# Patient Record
Sex: MALE | HISPANIC OR LATINO | Employment: UNEMPLOYED | URBAN - METROPOLITAN AREA
[De-identification: names, ages, dates, MRNs, and addresses within clinical notes are randomized per-mention and may not be internally consistent; named-entity substitution may affect disease eponyms.]

---

## 2020-07-16 ENCOUNTER — OFFICE VISIT (OUTPATIENT)
Dept: FAMILY MEDICINE CLINIC | Facility: CLINIC | Age: 30
End: 2020-07-16

## 2020-07-16 VITALS
TEMPERATURE: 96.8 F | WEIGHT: 219 LBS | SYSTOLIC BLOOD PRESSURE: 124 MMHG | DIASTOLIC BLOOD PRESSURE: 70 MMHG | HEIGHT: 60 IN | HEART RATE: 72 BPM | OXYGEN SATURATION: 99 % | BODY MASS INDEX: 43 KG/M2 | RESPIRATION RATE: 16 BRPM

## 2020-07-16 DIAGNOSIS — M25.531 RIGHT WRIST PAIN: Primary | ICD-10-CM

## 2020-07-16 DIAGNOSIS — L72.9 SKIN CYST: ICD-10-CM

## 2020-07-16 PROBLEM — Z86.69 HX OF MIGRAINES: Status: ACTIVE | Noted: 2019-01-26

## 2020-07-16 PROBLEM — R29.90 STROKE-LIKE SYMPTOMS: Status: ACTIVE | Noted: 2019-01-26

## 2020-07-16 PROCEDURE — 99202 OFFICE O/P NEW SF 15 MIN: CPT | Performed by: NURSE PRACTITIONER

## 2020-07-16 RX ORDER — MELOXICAM 15 MG/1
15 TABLET ORAL DAILY
Qty: 30 TABLET | Refills: 3 | Status: SHIPPED | OUTPATIENT
Start: 2020-07-16 | End: 2020-12-07

## 2020-07-16 NOTE — PROGRESS NOTES
"Subjective   Bautista Weber is a 30 y.o. female. Pt is new to me and practice. Kvng Apple assisted with Finnish translation during visit.   Hand Pain    History of Present Illness   Pt is here for c/o of right hand pain. Pt reports that he's had the pain for several years, but over the last month, pain has been worse. He has not taken anything for his pain. Pt denies any injury. Pt paints houses for a living.    Pt is also c/o pain in his left upper back, he report that he has had this pain for a couple of years. He states that he has not been seen about it and \"just lives with it\". Pt is not taking anything for his pain.     The following portions of the patient's history were reviewed and updated as appropriate: allergies, current medications, past family history, past medical history, past social history, past surgical history and problem list.    Review of Systems   Constitutional: Negative for chills, fatigue and fever.   HENT: Positive for congestion.    Respiratory: Negative.  Negative for cough, chest tightness and shortness of breath.    Cardiovascular: Negative.  Negative for chest pain, palpitations and leg swelling.   Musculoskeletal: Positive for back pain (left upper back.). Negative for arthralgias (right wrist. ), gait problem, joint swelling, myalgias, neck pain and neck stiffness.   Skin: Negative for dry skin.   Allergic/Immunologic: Positive for environmental allergies (seasonal allergies).   Neurological: Positive for weakness. Negative for dizziness and headache.   Psychiatric/Behavioral: Negative.  Negative for sleep disturbance and depressed mood. The patient is not nervous/anxious.        Objective   Physical Exam   Constitutional: She is oriented to person, place, and time. She appears well-developed and well-nourished.   HENT:   Head: Normocephalic and atraumatic.   Eyes: Pupils are equal, round, and reactive to light. Conjunctivae and EOM are normal.   Neck: Normal range of " motion and full passive range of motion without pain. Neck supple. No thyromegaly present.   Cardiovascular: Normal rate, regular rhythm, normal heart sounds and intact distal pulses.   No murmur heard.  Pulmonary/Chest: Effort normal and breath sounds normal.   Musculoskeletal: Normal range of motion. She exhibits no edema or deformity.        Right wrist: She exhibits tenderness. She exhibits no swelling, no effusion, no crepitus, no deformity and no laceration.        Cervical back: Normal.        Thoracic back: Normal.        Lumbar back: Normal.   Lymphadenopathy:     She has no cervical adenopathy.   Neurological: She is alert and oriented to person, place, and time.   Skin: Skin is warm and dry. No rash noted.   Grape sized lump under skin on upper left shoulder,  pain with palpation.    Psychiatric: She has a normal mood and affect. Her behavior is normal. Judgment and thought content normal.   Nursing note and vitals reviewed.        Assessment/Plan   Problem List Items Addressed This Visit     None      Visit Diagnoses     Right wrist pain    -  Primary    Relevant Medications    meloxicam (Mobic) 15 MG tablet    Other Relevant Orders    Ambulatory Referral to Hand Surgery    Skin cyst        Relevant Orders    Ambulatory Referral to Dermatology      Return in about 1 month (around 8/16/2020) for Annual, Labs.         Return in about 1 month (around 8/16/2020) for Annual, Labs.

## 2020-12-07 ENCOUNTER — OFFICE VISIT (OUTPATIENT)
Dept: FAMILY MEDICINE CLINIC | Facility: CLINIC | Age: 30
End: 2020-12-07

## 2020-12-07 VITALS
SYSTOLIC BLOOD PRESSURE: 112 MMHG | WEIGHT: 230 LBS | OXYGEN SATURATION: 98 % | HEIGHT: 69 IN | HEART RATE: 64 BPM | DIASTOLIC BLOOD PRESSURE: 74 MMHG | BODY MASS INDEX: 34.07 KG/M2 | RESPIRATION RATE: 14 BRPM

## 2020-12-07 DIAGNOSIS — Z23 NEED FOR IMMUNIZATION AGAINST INFLUENZA: ICD-10-CM

## 2020-12-07 DIAGNOSIS — R42 VERTIGO: ICD-10-CM

## 2020-12-07 DIAGNOSIS — Z13.220 SCREENING FOR HYPERLIPIDEMIA: ICD-10-CM

## 2020-12-07 DIAGNOSIS — Z79.899 HIGH RISK MEDICATION USE: ICD-10-CM

## 2020-12-07 DIAGNOSIS — Z11.59 NEED FOR HEPATITIS C SCREENING TEST: ICD-10-CM

## 2020-12-07 DIAGNOSIS — Z00.00 ANNUAL PHYSICAL EXAM: Primary | ICD-10-CM

## 2020-12-07 LAB
ALBUMIN SERPL-MCNC: 4.7 G/DL (ref 3.5–5.2)
ALBUMIN/GLOB SERPL: 1.9 G/DL
ALP SERPL-CCNC: 87 U/L (ref 39–117)
ALT SERPL-CCNC: 55 U/L (ref 1–41)
AST SERPL-CCNC: 26 U/L (ref 1–40)
BASOPHILS # BLD AUTO: 0.06 10*3/MM3 (ref 0–0.2)
BASOPHILS NFR BLD AUTO: 0.9 % (ref 0–1.5)
BILIRUB SERPL-MCNC: 0.4 MG/DL (ref 0–1.2)
BUN SERPL-MCNC: 11 MG/DL (ref 6–20)
BUN/CREAT SERPL: 12.5 (ref 7–25)
CALCIUM SERPL-MCNC: 9.3 MG/DL (ref 8.6–10.5)
CHLORIDE SERPL-SCNC: 103 MMOL/L (ref 98–107)
CHOLEST SERPL-MCNC: 178 MG/DL (ref 0–200)
CHOLEST/HDLC SERPL: 5.56 {RATIO}
CO2 SERPL-SCNC: 26.1 MMOL/L (ref 22–29)
CREAT SERPL-MCNC: 0.88 MG/DL (ref 0.76–1.27)
EOSINOPHIL # BLD AUTO: 0.08 10*3/MM3 (ref 0–0.4)
EOSINOPHIL NFR BLD AUTO: 1.3 % (ref 0.3–6.2)
ERYTHROCYTE [DISTWIDTH] IN BLOOD BY AUTOMATED COUNT: 13.4 % (ref 12.3–15.4)
GLOBULIN SER CALC-MCNC: 2.5 GM/DL
GLUCOSE SERPL-MCNC: 102 MG/DL (ref 65–99)
HCT VFR BLD AUTO: 47 % (ref 37.5–51)
HDLC SERPL-MCNC: 32 MG/DL (ref 40–60)
HGB BLD-MCNC: 16.1 G/DL (ref 13–17.7)
IMM GRANULOCYTES # BLD AUTO: 0.03 10*3/MM3 (ref 0–0.05)
IMM GRANULOCYTES NFR BLD AUTO: 0.5 % (ref 0–0.5)
LDLC SERPL CALC-MCNC: 92 MG/DL (ref 0–100)
LYMPHOCYTES # BLD AUTO: 1.51 10*3/MM3 (ref 0.7–3.1)
LYMPHOCYTES NFR BLD AUTO: 23.8 % (ref 19.6–45.3)
MCH RBC QN AUTO: 28.6 PG (ref 26.6–33)
MCHC RBC AUTO-ENTMCNC: 34.3 G/DL (ref 31.5–35.7)
MCV RBC AUTO: 83.6 FL (ref 79–97)
MONOCYTES # BLD AUTO: 0.49 10*3/MM3 (ref 0.1–0.9)
MONOCYTES NFR BLD AUTO: 7.7 % (ref 5–12)
NEUTROPHILS # BLD AUTO: 4.18 10*3/MM3 (ref 1.7–7)
NEUTROPHILS NFR BLD AUTO: 65.8 % (ref 42.7–76)
NRBC BLD AUTO-RTO: 0 /100 WBC (ref 0–0.2)
PLATELET # BLD AUTO: 241 10*3/MM3 (ref 140–450)
POTASSIUM SERPL-SCNC: 4.1 MMOL/L (ref 3.5–5.2)
PROT SERPL-MCNC: 7.2 G/DL (ref 6–8.5)
RBC # BLD AUTO: 5.62 10*6/MM3 (ref 4.14–5.8)
SODIUM SERPL-SCNC: 139 MMOL/L (ref 136–145)
TRIGL SERPL-MCNC: 321 MG/DL (ref 0–150)
VLDLC SERPL CALC-MCNC: 54 MG/DL (ref 5–40)
WBC # BLD AUTO: 6.35 10*3/MM3 (ref 3.4–10.8)

## 2020-12-07 PROCEDURE — 99395 PREV VISIT EST AGE 18-39: CPT | Performed by: NURSE PRACTITIONER

## 2020-12-07 PROCEDURE — 90686 IIV4 VACC NO PRSV 0.5 ML IM: CPT | Performed by: NURSE PRACTITIONER

## 2020-12-07 PROCEDURE — 99213 OFFICE O/P EST LOW 20 MIN: CPT | Performed by: NURSE PRACTITIONER

## 2020-12-07 PROCEDURE — 90471 IMMUNIZATION ADMIN: CPT | Performed by: NURSE PRACTITIONER

## 2020-12-07 RX ORDER — MECLIZINE HYDROCHLORIDE 25 MG/1
25 TABLET ORAL 3 TIMES DAILY PRN
Qty: 30 TABLET | Refills: 1 | Status: SHIPPED | OUTPATIENT
Start: 2020-12-07 | End: 2020-12-17

## 2020-12-07 NOTE — PATIENT INSTRUCTIONS
I will call you with your lab results.   Please call with any questions or concerns.   Return in about 1 year (around 2021) for Annual, Labs.  Mareos  Dizziness  Los mareos son un problema muy frecuente. Causan sensación de inestabilidad o de desvanecimiento. Puede sentir que se va a desmayar. Los mareos pueden provocarle noris lesión si se tropieza o se . La causa puede deberse a muchos problemas, tales mio los siguientes:  · Medicamentos.  · No tener suficiente agua en el cuerpo (deshidratación).  · Enfermedad.  Siga estas indicaciones en bobby casa:  Comida y bebida    · Brianne suficiente líquido para mantener el pis (orina) key o de color amarillo pálido. [place] tena la deshidratación. Trate de beber más líquidos transparentes, mio agua.  · No brianne alcohol.  · Limite la cantidad de cafeína que prashant o come si el médico se lo indica.  · Limite la cantidad de sal (sodio) que prashant o come si el médico se lo indica.  Actividad    · Evite los movimientos rápidos.  ? Cuando se levante de noris silla, sujétese hasta sentirse rae.  ? Por la mañana, siéntese shan a un lado de la cama. Cuando se sienta rae, póngase lentamente de pie mientras se sostiene de algo. Jairo esto hasta que se sienta seguro en cuanto al equilibrio.  · Mueva las piernas con frecuencia si debe estar de pie en un lugar shoaib mucho tiempo. Mientras esté de pie, contraiga y relaje los músculos de las piernas.  · No conduzca vehículos ni opere maquinaria pesada si se siente mareado.  · Evite agacharse si se siente mareado. En bobby casa, coloque los objetos en algún lugar que le resulte fácil alcanzarlos sin agacharse.  Estilo de lonny  · No consuma ningún producto que contenga nicotina o tabaco, mio cigarrillos y cigarrillos electrónicos. Si necesita ayuda para dejar de fumar, consulte al médico.  · Intente bajar el nivel de estrés. Para hacerlo, puede usar métodos mio el yoga o la meditación. Hable con el médico si necesita ayuda.  Instrucciones  generales  · Controle goyo mareos para saulo si hay cambios.  · [place] los medicamentos de venta katina y los recetados solamente mio se lo haya indicado el médico. Hable con el médico si melody que la causa de goyo mareos es algún medicamento que está tomando.  · Infórmele a un amigo o a un familiar si se siente mareado. Pídale a esta persona que llame al médico si observa cambios en bobby comportamiento.  · Concurra a todas las visitas de control mio se lo haya indicado el médico. [place] es importante.  Comuníquese con un médico si:  · Los mareos persisten.  · Los mareos o la sensación de desvanecimiento empeoran.  · Siente malestar estomacal (náuseas).  · Tiene problemas para escuchar.  · Aparecen nuevos síntomas.  · Siente inestabilidad al estar de pie.  · Siente que la habitación da vueltas.  Solicite ayuda de inmediato si:  · Vomita o tiene heces acuosas (diarrea), y no puede comer o beber nada.  · Tiene dificultad para hacer lo siguiente:  ? Hablar.  ? Caminar.  ? Tragar.  ? Usar los brazos, las alexandria o las piernas.  · Se siente constantemente débil.  · No piensa con claridad o tiene dificultad para armar oraciones. Es posible que un amigo o un familiar adviertan que esto ocurre.  · Tiene los siguientes síntomas:  ? Dolor en el pecho.  ? Dolor en el vientre (abdomen).  ? Falta de aire.  ? Sudoración.  · Cambios en la visión.  · Sangrado.  · Dolor de gui muy intenso.  · Dolor o rigidez en el taran.  · Fiebre.  Estos síntomas pueden indicar noris emergencia. No espere hasta que los síntomas desaparezcan. Solicite atención médica de inmediato. Comuníquese con el servicio de emergencias de bobby localidad (911 en [place]). No conduzca por goyo propios medios hasta el hospital.  Resumen  · Los mareos causan sensación de inestabilidad o de desvanecimiento. Puede sentir que se va a desmayar.  · [place] suficiente líquido para mantener el pis (orina) key o de color amarillo pálido. No james alcohol.  · Evite los  movimientos rápidos si se siente mareado.  · Controle goyo mareos para saulo si hay cambios.  Esta información no tiene mio fin reemplazar el consejo del médico. Asegúrese de hacerle al médico cualquier pregunta que tenga.  Document Revised: 06/21/2018 Document Reviewed: 06/21/2018  Elsevier Patient Education © 2020 [employer] Inc.

## 2020-12-07 NOTE — PROGRESS NOTES
Preventive Exam    History of Present Illness: Bautista Weber is a 30 y.o. here for check up and review of routine health maintenance. he states he is doing well and has no concerns.  A  was present during visit and assisted with history and visit.     Past medical history, surgical history and family history have been reviewed.     Review of Systems   Constitutional: Negative for appetite change, chills, fatigue, fever, unexpected weight gain and unexpected weight loss.   HENT: Positive for congestion (in PM). Negative for dental problem, postnasal drip, rhinorrhea, sinus pressure and sore throat.         Dental exam is up to date.    Eyes: Negative.  Negative for blurred vision, double vision and photophobia.        Patient wears glasses. Eye exam is up to date.    Respiratory: Negative for cough, chest tightness, shortness of breath and wheezing.    Cardiovascular: Negative for chest pain, palpitations and leg swelling.   Gastrointestinal: Positive for diarrhea (with spicy foods. ). Negative for abdominal pain, constipation, nausea, vomiting and GERD.   Endocrine: Positive for polyphagia and polyuria. Negative for cold intolerance, heat intolerance and polydipsia.   Genitourinary: Negative.  Negative for decreased libido, dysuria, frequency, nocturia, scrotal swelling and testicular pain.   Musculoskeletal: Negative for arthralgias, back pain, joint swelling and myalgias.   Skin: Negative.    Allergic/Immunologic: Negative.  Negative for environmental allergies and food allergies.   Neurological: Positive for dizziness. Negative for weakness, numbness and headache.   Hematological: Negative.  Does not bruise/bleed easily.   Psychiatric/Behavioral: Negative.  Negative for sleep disturbance, suicidal ideas and depressed mood. The patient is not nervous/anxious.        PHYSICAL EXAM    Vitals:    12/07/20 0825   BP: 112/74   Pulse: 64   Resp: 14   SpO2: 98%     Body mass index is 33.97  kg/m².    Physical Exam  Vitals signs and nursing note reviewed.   Constitutional:       Appearance: He is well-developed.   HENT:      Head: Normocephalic and atraumatic.      Right Ear: Tympanic membrane, ear canal and external ear normal.      Left Ear: Tympanic membrane, ear canal and external ear normal.      Nose: Nose normal.      Mouth/Throat:      Lips: Pink.      Mouth: Mucous membranes are moist.      Tongue: No lesions.      Palate: No mass and lesions.      Pharynx: Oropharynx is clear. Uvula midline.      Tonsils: No tonsillar exudate.   Eyes:      Conjunctiva/sclera: Conjunctivae normal.      Pupils: Pupils are equal, round, and reactive to light.   Neck:      Musculoskeletal: Normal range of motion and neck supple.      Thyroid: No thyromegaly.   Cardiovascular:      Rate and Rhythm: Normal rate and regular rhythm.      Pulses: Normal pulses.           Dorsalis pedis pulses are 2+ on the right side and 2+ on the left side.        Posterior tibial pulses are 2+ on the right side and 2+ on the left side.      Heart sounds: Normal heart sounds. No murmur.   Pulmonary:      Effort: Pulmonary effort is normal.      Breath sounds: Normal breath sounds.   Abdominal:      General: Bowel sounds are normal. There is no distension.      Palpations: Abdomen is soft.      Tenderness: There is no abdominal tenderness.   Musculoskeletal: Normal range of motion.         General: No deformity.      Right lower leg: No edema.      Left lower leg: No edema.   Lymphadenopathy:      Head:      Right side of head: No submental, submandibular, tonsillar, preauricular, posterior auricular or occipital adenopathy.      Left side of head: No submental, submandibular, tonsillar, preauricular, posterior auricular or occipital adenopathy.      Cervical: No cervical adenopathy.      Right cervical: No superficial, deep or posterior cervical adenopathy.     Left cervical: No superficial, deep or posterior cervical adenopathy.       Upper Body:      Right upper body: No supraclavicular adenopathy.      Left upper body: No supraclavicular adenopathy.   Skin:     General: Skin is warm and dry.      Capillary Refill: Capillary refill takes 2 to 3 seconds.   Neurological:      General: No focal deficit present.      Mental Status: He is alert and oriented to person, place, and time.      Cranial Nerves: Cranial nerves are intact. No cranial nerve deficit.      Sensory: Sensation is intact.      Motor: Motor function is intact.      Coordination: Coordination is intact.      Gait: Gait is intact.   Psychiatric:         Attention and Perception: Attention and perception normal.         Mood and Affect: Mood normal.         Speech: Speech normal.         Behavior: Behavior normal. Behavior is cooperative.         Thought Content: Thought content normal.         Cognition and Memory: Cognition and memory normal.         Judgment: Judgment normal.         Procedures    Diagnoses and all orders for this visit:    1. Annual physical exam (Primary)  -     Comprehensive Metabolic Panel  -     Lipid Panel With / Chol / HDL Ratio  -     CBC & Differential  -     Hepatitis C Antibody    2. Need for immunization against influenza  -     FluLaval Quad >6 Months (3179-6262)    3. Screening for hyperlipidemia  -     Lipid Panel With / Chol / HDL Ratio    4. Need for hepatitis C screening test  -     Hepatitis C Antibody    5. High risk medication use  -     Comprehensive Metabolic Panel  -     CBC & Differential    6. Vertigo  -     Ambulatory Referral to ENT (Otolaryngology)  -     meclizine (ANTIVERT) 25 MG tablet; Take 1 tablet by mouth 3 (Three) Times a Day As Needed for Dizziness for up to 10 days.  Dispense: 30 tablet; Refill: 1        Problems Addressed this Visit     None      Visit Diagnoses     Annual physical exam    -  Primary    Relevant Orders    Comprehensive Metabolic Panel    Lipid Panel With / Chol / HDL Ratio    CBC & Differential    Hepatitis C  Antibody    Need for immunization against influenza        Relevant Orders    FluLaval Quad >6 Months (6786-5511)    Screening for hyperlipidemia        Relevant Orders    Lipid Panel With / Chol / HDL Ratio    Need for hepatitis C screening test        Relevant Orders    Hepatitis C Antibody    High risk medication use        Relevant Orders    Comprehensive Metabolic Panel    CBC & Differential    Vertigo        Relevant Medications    meclizine (ANTIVERT) 25 MG tablet    Other Relevant Orders    Ambulatory Referral to ENT (Otolaryngology)      Diagnoses       Codes Comments    Annual physical exam    -  Primary ICD-10-CM: Z00.00  ICD-9-CM: V70.0     Need for immunization against influenza     ICD-10-CM: Z23  ICD-9-CM: V04.81     Screening for hyperlipidemia     ICD-10-CM: Z13.220  ICD-9-CM: V77.91     Need for hepatitis C screening test     ICD-10-CM: Z11.59  ICD-9-CM: V73.89     High risk medication use     ICD-10-CM: Z79.899  ICD-9-CM: V58.69     Vertigo     ICD-10-CM: R42  ICD-9-CM: 780.4           Routine health maintenance reviewed and discussed with Bautista Weber.    Preventative counseling regarding healthy diet and exercise.   Pt reports that he wears a seatbelt regularly.  Return in about 1 year (around 2021) for Annual, Labs.     Patient Instructions   I will call you with your lab results.   Please call with any questions or concerns.   Return in about 1 year (around 2021) for Annual, Labs.  Mareos  Dizziness  Los mareos son un problema muy frecuente. Causan sensación de inestabilidad o de desvanecimiento. Puede sentir que se va a desmayar. Los mareos pueden provocarle noris lesión si se tropieza o se . La causa puede deberse a muchos problemas, tales mio los siguientes:  · Medicamentos.  · No tener suficiente agua en el cuerpo (deshidratación).  · Enfermedad.  Siga estas indicaciones en bobby casa:  Comida y bebida    · Brianne suficiente líquido para mantener el pis (orina) key o de  color amarillo pálido. [place] tena la deshidratación. Trate de beber más líquidos transparentes, mio agua.  · No james alcohol.  · Limite la cantidad de cafeína que prashant o come si el médico se lo indica.  · Limite la cantidad de sal (sodio) que prashant o come si el médico se lo indica.  Actividad    · Evite los movimientos rápidos.  ? Cuando se levante de noris silla, sujétese hasta sentirse rae.  ? Por la mañana, siéntese shan a un lado de la cama. Cuando se sienta rae, póngase lentamente de pie mientras se sostiene de algo. Jairo esto hasta que se sienta seguro en cuanto al equilibrio.  · Mueva las piernas con frecuencia si debe estar de pie en un lugar shoaib mucho tiempo. Mientras esté de pie, contraiga y relaje los músculos de las piernas.  · No conduzca vehículos ni opere maquinaria pesada si se siente mareado.  · Evite agacharse si se siente mareado. En bobby casa, coloque los objetos en algún lugar que le resulte fácil alcanzarlos sin agacharse.  Estilo de lonny  · No consuma ningún producto que contenga nicotina o tabaco, mio cigarrillos y cigarrillos electrónicos. Si necesita ayuda para dejar de fumar, consulte al médico.  · Intente bajar el nivel de estrés. Para hacerlo, puede usar métodos mio el yoga o la meditación. Hable con el médico si necesita ayuda.  Instrucciones generales  · Controle goyo mareos para saulo si hay cambios.  · [place] los medicamentos de venta katina y los recetados solamente mio se lo haya indicado el médico. Hable con el médico si melody que la causa de goyo mareos es algún medicamento que está tomando.  · Infórmele a un amigo o a un familiar si se siente mareado. Pídale a esta persona que llame al médico si observa cambios en bobby comportamiento.  · Concurra a todas las visitas de control mio se lo haya indicado el médico. [place] es importante.  Comuníquese con un médico si:  · Los mareos persisten.  · Los mareos o la sensación de desvanecimiento empeoran.  · Siente malestar estomacal  (náuseas).  · Tiene problemas para escuchar.  · Aparecen nuevos síntomas.  · Siente inestabilidad al estar de pie.  · Siente que la habitación da vueltas.  Solicite ayuda de inmediato si:  · Vomita o tiene heces acuosas (diarrea), y no puede comer o beber nada.  · Tiene dificultad para hacer lo siguiente:  ? Hablar.  ? Caminar.  ? Tragar.  ? Usar los brazos, las alexandria o las piernas.  · Se siente constantemente débil.  · No piensa con claridad o tiene dificultad para armar oraciones. Es posible que un amigo o un familiar adviertan que esto ocurre.  · Tiene los siguientes síntomas:  ? Dolor en el pecho.  ? Dolor en el vientre (abdomen).  ? Falta de aire.  ? Sudoración.  · Cambios en la visión.  · Sangrado.  · Dolor de gui muy intenso.  · Dolor o rigidez en el taran.  · Fiebre.  Estos síntomas pueden indicar noris emergencia. No espere hasta que los síntomas desaparezcan. Solicite atención médica de inmediato. Comuníquese con el servicio de emergencias de bobby localidad (911 en [place]). No conduzca por goyo propios medios hasta el hospital.  Resumen  · Los mareos causan sensación de inestabilidad o de desvanecimiento. Puede sentir que se va a desmayar.  · Brianne suficiente líquido para mantener el pis (orina) key o de color amarillo pálido. No brianne alcohol.  · Evite los movimientos rápidos si se siente mareado.  · Controle goyo mareos para saulo si hay cambios.  Esta información no tiene mio fin reemplazar el consejo del médico. Asegúrese de hacerle al médico cualquier pregunta que zach.  Document Revised: 06/21/2018 Document Reviewed: 06/21/2018  Elsevier Patient Education © 2020 Else[employer] Inc.

## 2020-12-08 LAB — HCV AB S/CO SERPL IA: <0.1 S/CO RATIO (ref 0–0.9)

## 2020-12-08 NOTE — PROGRESS NOTES
Please call patient (he only speaks [place]) and let them know that their results are normal, with the exception of his lipid panel.  His triglycerides are elevated at 321, HDL is low.  I would recommend that he decrease carbohydrates in his diet and increase his exercise.  If he has any questions, please let me know. Thanks.